# Patient Record
Sex: MALE | URBAN - METROPOLITAN AREA
[De-identification: names, ages, dates, MRNs, and addresses within clinical notes are randomized per-mention and may not be internally consistent; named-entity substitution may affect disease eponyms.]

---

## 2022-12-22 ENCOUNTER — TELEPHONE (OUTPATIENT)
Dept: UROLOGY | Facility: MEDICAL CENTER | Age: 22
End: 2022-12-22

## 2022-12-22 NOTE — TELEPHONE ENCOUNTER
Please Triage -   New Patient- [place]     What is the reason for the patients appointment? Yue from Dr Alisha Murcia called stating patient is being referred for testicular pain  Patient had u/s of scrotum on 12/10/22 at [facility]  It showed large left varicocele  Office will fax over records to [place] office  Dr Lupe Toro number is [phone]  Patient can be called directly to set up appointment  [phone]       Imaging/Lab Results:      Do we accept the patient's insurance or is the patient Self-Pay? Provider & Plan:   Member ID#: Has the patient had any previous urologist(s)?no        Have patient records been requested? records will be fax to office        Has the patient had any outside testing done?u/s of scrotum      Does the patient have a personal history of cancer?no       Patient can be reached at :

## 2023-01-11 ENCOUNTER — OFFICE VISIT (OUTPATIENT)
Dept: UROLOGY | Facility: CLINIC | Age: 23
End: 2023-01-11

## 2023-01-11 VITALS
TEMPERATURE: 96.9 F | HEIGHT: 70 IN | SYSTOLIC BLOOD PRESSURE: 124 MMHG | WEIGHT: 142 LBS | BODY MASS INDEX: 20.33 KG/M2 | OXYGEN SATURATION: 98 % | HEART RATE: 74 BPM | DIASTOLIC BLOOD PRESSURE: 78 MMHG

## 2023-01-11 DIAGNOSIS — I86.1 LEFT VARICOCELE: Primary | ICD-10-CM

## 2023-01-11 NOTE — PROGRESS NOTES
UROLOGY PROGRESS NOTE         NAME: Jeni Cruz  AGE: 23 y o  SEX: male  : 2000   MRN: [id number]    DATE: 2023  TIME: 1:39 PM    Assessment and Plan      Impression:   1  Left varicocele       Plan: Patient has a small, nontender grade 1 left varicocele on exam which does not appear to be causing any clinically significant symptoms  There are no issues with infertility at this point  He is not trying to have children at this point  The testes are equal in size and consistency  The varicocele does not appear to be causing pain  I offered him a semen analysis which he declined  I explained to him that prophylactic varicocelectomy is controversial in regard to future fertility  He will be performing self exams monthly and prefers watchful waiting at this point  He will contact us if anything changes or desires intervention  I did review nonsurgical approaches with interventional radiology and embolization as well as surgical intervention for varicoceles  His questions were answered      Chief Complaint   No chief complaint on file  History of Present Illness     HPI: Jeni Cruz is a 23y o  year old male who presents with left varicocele on ultrasound  Patient had some ill-defined discomfort in the region of the left groin and left scrotum  This has been off and on and less more recently  He had a scrotal sonogram which revealed a left varicocele but there were no other clinically significant issues  He is not having pain  He denies any scrotal masses  He does self exams                The following portions of the patient's history were reviewed and updated as appropriate: allergies, current medications, past family history, past medical history, past social history, past surgical history and problem list   Past Medical History:   Diagnosis Date   • Allergies      Past Surgical History:   Procedure Laterality Date   • WISDOM TOOTH EXTRACTION       shoulder  Review of Systems     Const: Denies chills, fever and weight loss  CV: Denies chest pain  Resp: Denies SOB  GI: Denies abdominal pain, nausea and vomiting  : Denies symptoms other than stated above  Musculo: Denies back pain  Objective   /78 (BP Location: Right arm, Patient Position: Sitting, Cuff Size: Standard)   Pulse 74   Temp (!) 96 9 °F (36 1 °C)   Ht 5' 10" (1 778 m)   Wt 64 4 kg (142 lb)   SpO2 98%   BMI 20 37 kg/m²     Physical Exam  Const: Appears healthy and well developed  No signs of acute distress present  Resp: Respirations are regular and unlabored  CV: Rate is regular  Rhythm is regular  Abdomen: Abdomen is soft, nontender, and nondistended  Kidneys are not palpable  : As above penis and testes are normal   Testes are equal in size  Testes are normal in consistency  No atrophy noted  There is a small grade 1 left varicocele best noted with the patient in the standing position with cough  No tenderness  No evidence of hernia  Right-sided structures normal   Each epididymis is normal   Nontender no testicular mass  Psych: Patient's attitude is cooperative  Mood is normal  Affect is normal     Procedure   Procedures     Current Medications   No current outpatient medications on file          Johnathan Shaw MD